# Patient Record
Sex: FEMALE | Race: WHITE | Employment: UNEMPLOYED | URBAN - METROPOLITAN AREA
[De-identification: names, ages, dates, MRNs, and addresses within clinical notes are randomized per-mention and may not be internally consistent; named-entity substitution may affect disease eponyms.]

---

## 2019-06-12 ENCOUNTER — HOSPITAL ENCOUNTER (EMERGENCY)
Age: 64
Discharge: HOME | End: 2019-06-12
Payer: MEDICARE

## 2019-06-12 VITALS
SYSTOLIC BLOOD PRESSURE: 131 MMHG | TEMPERATURE: 98.3 F | HEIGHT: 59 IN | OXYGEN SATURATION: 97 % | HEART RATE: 101 BPM | RESPIRATION RATE: 16 BRPM | WEIGHT: 130 LBS | BODY MASS INDEX: 26.21 KG/M2 | DIASTOLIC BLOOD PRESSURE: 89 MMHG

## 2019-06-12 DIAGNOSIS — R82.5 POSITIVE URINE DRUG SCREEN: Primary | ICD-10-CM

## 2019-06-12 LAB
AMPHETAMINES: NEGATIVE
BARBITURATE SCREEN URINE: NEGATIVE
BENZODIAZEPINE SCREEN, URINE: NEGATIVE
CANNABINOID SCREEN URINE: ABNORMAL
COCAINE METABOLITE: NEGATIVE
OPIATES, URINE: NEGATIVE
OXYCODONE: ABNORMAL
PHENCYCLIDINE, URINE: NEGATIVE

## 2019-06-12 PROCEDURE — 80307 DRUG TEST PRSMV CHEM ANLYZR: CPT

## 2019-06-12 PROCEDURE — 99283 EMERGENCY DEPT VISIT LOW MDM: CPT

## 2019-06-12 NOTE — ED PROVIDER NOTES
EMERGENCY DEPARTMENT ENCOUNTER      PCP: No primary care provider on file. CHIEF COMPLAINT    Chief Complaint   Patient presents with    Other     requesting drug test      This patient was not evaluated by the attending physician. I have independently evaluated this patient. HPI    Jass Galvez is a 62 y.o. female who presents with request for urine drug screen. Patient states she is currently working through [employer] after being accused of using drugs and alcohol. Patient states she is trying to gain custody of her great-grandchildren. Patient denies any drug or alcohol use. Patient states she went to [employer] today and tested positive for marijuana. Patient denies use of marijuana. Patient denies fever, chest pain, shortness of breath, abdominal pain, vomiting, diarrhea. REVIEW OF SYSTEMS    Constitutional:  Denies fever   HENT:  Denies sore throat or ear pain   Cardiovascular:  Denies chest pain  Respiratory:  Denies shortness of breath    GI:  Denies abdominal pain, vomiting, or diarrhea  :  Denies any urinary symptoms  Musculoskeletal:  Denies back pain  Skin:  Denies rash  Neurologic:  Denies headache, focal weakness or sensory changes     All other review of systems are negative  See HPI and nursing notes for additional information     PAST MEDICAL AND SURGICAL HISTORY    History reviewed. No pertinent past medical history. History reviewed. No pertinent surgical history.     CURRENT MEDICATIONS        ALLERGIES    Allergies   Allergen Reactions    Codeine Nausea And Vomiting       SOCIAL AND FAMILY HISTORY    Social History     Socioeconomic History    Marital status: Single     Spouse name: None    Number of children: None    Years of education: None    Highest education level: None   Occupational History    None   Social Needs    Financial resource strain: None    Food insecurity:     Worry: None     Inability: None    Transportation needs:     Medical: None Non-medical: None   Tobacco Use    Smoking status: Never Smoker   Substance and Sexual Activity    Alcohol use: No    Drug use: Yes     Types: Marijuana    Sexual activity: None   Lifestyle    Physical activity:     Days per week: None     Minutes per session: None    Stress: None   Relationships    Social connections:     Talks on phone: None     Gets together: None     Attends Christianity service: None     Active member of club or organization: None     Attends meetings of clubs or organizations: None     Relationship status: None    Intimate partner violence:     Fear of current or ex partner: None     Emotionally abused: None     Physically abused: None     Forced sexual activity: None   Other Topics Concern    None   Social History Narrative    None     History reviewed. No pertinent family history. PHYSICAL EXAM    VITAL SIGNS: /89   Pulse 101   Temp 98.3 °F (36.8 °C) (Oral)   Resp 16   Ht 4' 11\" (1.499 m)   Wt 130 lb (59 kg)   SpO2 97%   BMI 26.26 kg/m²    Constitutional:  Well developed, Well nourished  HENT:  Normocephalic, Atraumatic, PERRL. Sclera clear. Conjunctiva normal, No discharge. Neck/Lymphatics: supple, no JVD, no swollen nodes  Cardiovascular: Tachycardic, Normal rhythm  Respiratory:  Nonlabored breathing. Normal breath sounds, No wheezing  Abdomen: Bowel sounds normal, Soft, No tenderness, no masses. Musculoskeletal: No edema, No tenderness, No cyanosis  Integument:  Warm, Dry  Neurologic:  Alert & oriented , No focal deficits noted. Sensation intact.   Psychiatric:  Affect normal, Mood normal.       Labs:  Results for orders placed or performed during the hospital encounter of 06/12/19   Urine Drug Screen   Result Value Ref Range    Cannabinoid Scrn, Ur UNCONFIRMED POSITIVE (A) NEGATIVE    Amphetamines NEGATIVE NEGATIVE    Cocaine Metabolite NEGATIVE NEGATIVE    Benzodiazepine Screen, Urine NEGATIVE NEGATIVE    Barbiturate Screen, Ur NEGATIVE NEGATIVE    Opiates, Urine NEGATIVE NEGATIVE    Phencyclidine, Urine NEGATIVE NEGATIVE    Oxycodone  NEGATIVE     NEGATIVE          THRESHOLD CONCENTRATIONS (mg/dL)  AMPHT               1000  ANETA,OPIA             300  BZO,BAR              200  PCP                   25  THC                   50  OXY                  100          IF POSITIVE, SPECIMEN WILL BE  DISCARDED AFTER 6 MONTHS. CALL LAB IF CONFIRMATION NEEDED. ALL NEGATIVE SPECIMENS WILL BE  DISCARDED AFTER ONE WEEK. * UNCONFIRMED POSITIVES MAY  NOT MEET FORENSIC REQUIREMENTS. ED COURSE & MEDICAL DECISION MAKING      Patient presents as above. Patient has no other complaints. Urine drug screen is done in the emergency department today and is positive for cannabinoids I discussed results with patient today and I recommend follow-up with gabby and July Gregg as scheduled. Clinical  IMPRESSION    1. Positive urine drug screen        I discussed with patient the importance of return to the emergency department if new or worsening symptoms develop. Comment: Please note this report has been produced using speech recognition software and may contain errors related to that system including errors in grammar, punctuation, and spelling, as well as words and phrases that may be inappropriate. If there are any questions or concerns please feel free to contact the dictating provider for clarification.             Jackalyn Spatz, PA-C  06/12/19 [id number]

## 2019-06-13 NOTE — ED NOTES
Discharge instructions reviewed. Pt verbalized understanding.        Jessica Qureshi RN  06/12/19 [id number]

## 2019-06-13 NOTE — ED NOTES
Patient requesting drug screen reports that she is going through court for grandchildren and was told she tested positive for cannabinoid and wants a second Rhonda Meneses RN  06/12/19 [id number]

## 2025-07-27 ENCOUNTER — HOSPITAL ENCOUNTER (EMERGENCY)
Age: 70
Discharge: HOME | End: 2025-07-27
Attending: STUDENT IN AN ORGANIZED HEALTH CARE EDUCATION/TRAINING PROGRAM
Payer: MEDICAID

## 2025-07-27 VITALS
DIASTOLIC BLOOD PRESSURE: 76 MMHG | HEART RATE: 87 BPM | HEIGHT: 59 IN | RESPIRATION RATE: 20 BRPM | OXYGEN SATURATION: 94 % | BODY MASS INDEX: 30.24 KG/M2 | WEIGHT: 150 LBS | SYSTOLIC BLOOD PRESSURE: 85 MMHG | TEMPERATURE: 98.5 F

## 2025-07-27 DIAGNOSIS — G89.29 OTHER CHRONIC PAIN: Primary | ICD-10-CM

## 2025-07-27 PROCEDURE — 99283 EMERGENCY DEPT VISIT LOW MDM: CPT

## 2025-07-27 PROCEDURE — 6370000000 HC RX 637 (ALT 250 FOR IP): Performed by: STUDENT IN AN ORGANIZED HEALTH CARE EDUCATION/TRAINING PROGRAM

## 2025-07-27 RX ORDER — IBUPROFEN 800 MG/1
800 TABLET, FILM COATED ORAL 2 TIMES DAILY PRN
Qty: 60 TABLET | Refills: 0 | Status: SHIPPED | OUTPATIENT
Start: 2025-07-27

## 2025-07-27 RX ORDER — IBUPROFEN 400 MG/1
800 TABLET, FILM COATED ORAL ONCE
Status: COMPLETED | OUTPATIENT
Start: 2025-07-27 | End: 2025-07-27

## 2025-07-27 RX ADMIN — IBUPROFEN 800 MG: 400 TABLET ORAL at 20:47

## 2025-07-27 ASSESSMENT — PAIN SCALES - GENERAL
PAINLEVEL_OUTOF10: 5
PAINLEVEL_OUTOF10: 5

## 2025-07-27 ASSESSMENT — LIFESTYLE VARIABLES
HOW OFTEN DO YOU HAVE A DRINK CONTAINING ALCOHOL: NEVER
HOW MANY STANDARD DRINKS CONTAINING ALCOHOL DO YOU HAVE ON A TYPICAL DAY: PATIENT DOES NOT DRINK

## 2025-07-27 ASSESSMENT — PAIN - FUNCTIONAL ASSESSMENT: PAIN_FUNCTIONAL_ASSESSMENT: 0-10

## 2025-07-28 NOTE — ED TRIAGE NOTES
Patient presents to the ED from home with complaints of a fall. Patient states she is out of ibuprofen at home to take and just needs some. Rates her pain 5/10 at this time.

## 2025-07-30 NOTE — ED PROVIDER NOTES
Emergency Department Encounter    Patient: Paulie Suh  MRN: [id number]  : 1955  Date of Evaluation: 2025  ED Provider:  Juan Lerma DO    Triage Chief Complaint:   Fall (Denies hitting her head or LOC. States she just wants some ibuprofen and doesn't have any. )    [place]:  Paulie Suh is a 70 y.o. female that presents for needing NSAIDs.  In triage it was noted that she presents for a fall however when I further clarified with her she did not have any new fall.  She states that she had she had a fall several months ago.  She has had some degree of chronic pain in her lower extremities more prominently on the right side.  This has not changed in any way.  She denies any other concerns.  She states that she takes 800 mg of ibuprofen for when her pain gets bad but she did not have any today so she presents to the ER.    MDM:    History from : Patient    Overall patient appears well she is not in any distress.  She actually has not had any new falls or injuries.  She is simply here for ibuprofen.  She has no other new complaints or concerns.  She was given dose of ibuprofen here and sent home with a prescription.  Recommended further follow-up with PCP.             Patient was given the following medications:  Medications   ibuprofen (ADVIL;MOTRIN) tablet 800 mg (800 mg Oral Given 25)       Discharge condition: stable    I am the Primary Clinician of Record.    Is this patient to be included in the SEP-1 Core Measure due to severe sepsis or septic shock?   No   Exclusion criteria - the patient is NOT to be included for SEP-1 Core Measure due to:  Infection is not suspected      ROS - see HPI, below listed is current ROS at time of my eval:  systems reviewed and negative except as above.     History reviewed. No pertinent past medical history.  History reviewed. No pertinent surgical history.  History reviewed. No pertinent family history.  Social History     Socioeconomic History